# Patient Record
Sex: MALE | Race: WHITE | NOT HISPANIC OR LATINO | Employment: FULL TIME | URBAN - METROPOLITAN AREA
[De-identification: names, ages, dates, MRNs, and addresses within clinical notes are randomized per-mention and may not be internally consistent; named-entity substitution may affect disease eponyms.]

---

## 2018-04-10 ENCOUNTER — HISTORICAL (OUTPATIENT)
Dept: ADMINISTRATIVE | Facility: HOSPITAL | Age: 38
End: 2018-04-10

## 2018-04-10 LAB
ABS NEUT (OLG): 5.9
ALBUMIN SERPL-MCNC: 4.9 GM/DL (ref 3.4–5)
ALBUMIN/GLOB SERPL: 3.27 {RATIO} (ref 1.5–2.5)
ALP SERPL-CCNC: 69 UNIT/L (ref 38–126)
ALT SERPL-CCNC: 22 UNIT/L (ref 7–52)
APPEARANCE, UA: CLEAR
AST SERPL-CCNC: 23 UNIT/L (ref 15–37)
BACTERIA #/AREA URNS AUTO: NORMAL /HPF
BILIRUB SERPL-MCNC: 0.8 MG/DL (ref 0.2–1)
BILIRUB UR QL STRIP: NEGATIVE MG/DL
BILIRUBIN DIRECT+TOT PNL SERPL-MCNC: 0.2 MG/DL (ref 0–0.5)
BUN SERPL-MCNC: 12 MG/DL (ref 7–18)
CALCIUM SERPL-MCNC: 9.6 MG/DL (ref 8.5–10)
CHLORIDE SERPL-SCNC: 103 MMOL/L (ref 98–107)
CHOLEST SERPL-MCNC: 184 MG/DL (ref 0–200)
CHOLEST/HDLC SERPL: 5.3 {RATIO}
CO2 SERPL-SCNC: 29 MMOL/L (ref 21–32)
COLOR UR: YELLOW
CREAT SERPL-MCNC: 1.14 MG/DL (ref 0.6–1.3)
CREAT/UREA NIT SERPL: 10.5
ERYTHROCYTE [DISTWIDTH] IN BLOOD BY AUTOMATED COUNT: 13.1 % (ref 11.5–17)
GGT SERPL-CCNC: 36 UNIT/L (ref 5–85)
GLOBULIN SER-MCNC: 1.5 GM/DL (ref 1.2–3)
GLUCOSE (UA): NEGATIVE MG/DL
GLUCOSE SERPL-MCNC: 100 MG/DL (ref 74–106)
HCT VFR BLD AUTO: 48.7 % (ref 42–52)
HDLC SERPL-MCNC: 35 MG/DL (ref 35–60)
HGB BLD-MCNC: 17 GM/DL (ref 14–18)
HGB UR QL STRIP: NEGATIVE UNIT/L
KETONES UR QL STRIP: NEGATIVE MG/DL
LDH SERPL-CCNC: 157 UNIT/L (ref 140–271)
LDLC SERPL CALC-MCNC: 125 MG/DL (ref 0–129)
LEUKOCYTE ESTERASE UR QL STRIP: NEGATIVE UNIT/L
LYMPHOCYTES # BLD AUTO: 1.7 X10(3)/MCL (ref 0.6–3.4)
LYMPHOCYTES NFR BLD AUTO: 20.5 % (ref 13–40)
MCH RBC QN AUTO: 32.7 PG (ref 27–31.2)
MCHC RBC AUTO-ENTMCNC: 35 GM/DL (ref 32–36)
MCV RBC AUTO: 94 FL (ref 80–94)
MONOCYTES # BLD AUTO: 0.8 X10(3)/MCL (ref 0–1.8)
MONOCYTES NFR BLD AUTO: 9.8 % (ref 0.1–24)
NEUTROPHILS NFR BLD AUTO: 69.7 % (ref 47–80)
NITRITE UR QL STRIP.AUTO: NEGATIVE
PH UR STRIP: 7.5 [PH]
PLATELET # BLD AUTO: 253 X10(3)/MCL (ref 130–400)
PMV BLD AUTO: 9 FL
POTASSIUM SERPL-SCNC: 4 MMOL/L (ref 3.5–5.1)
PROT SERPL-MCNC: 6.4 GM/DL (ref 6.4–8.2)
PROT UR QL STRIP: NEGATIVE MG/DL
RBC # BLD AUTO: 5.2 X10(6)/MCL (ref 4.7–6.1)
RBC #/AREA URNS HPF: NORMAL /HPF
SODIUM SERPL-SCNC: 138 MMOL/L (ref 136–145)
SP GR UR STRIP: 1.02
SQUAMOUS EPITHELIAL, UA: NORMAL /LPF
TRIGL SERPL-MCNC: 135 MG/DL (ref 30–150)
UROBILINOGEN UR STRIP-ACNC: 0.2 MG/DL
VLDLC SERPL CALC-MCNC: 27 MG/DL
WBC # SPEC AUTO: 8.4 X10(3)/MCL (ref 4.5–11.5)
WBC #/AREA URNS AUTO: NORMAL /[HPF]

## 2019-08-27 ENCOUNTER — HISTORICAL (OUTPATIENT)
Dept: ADMINISTRATIVE | Facility: HOSPITAL | Age: 39
End: 2019-08-27

## 2019-08-27 LAB
ABS NEUT (OLG): 4.8 X10(3)/MCL (ref 2.1–9.2)
ALBUMIN SERPL-MCNC: 5 GM/DL (ref 3.4–5)
ALBUMIN/GLOB SERPL: 2.08 {RATIO} (ref 1.5–2.5)
ALP SERPL-CCNC: 71 UNIT/L (ref 38–126)
ALT SERPL-CCNC: 13 UNIT/L (ref 7–52)
APPEARANCE, UA: CLEAR
AST SERPL-CCNC: 25 UNIT/L (ref 15–37)
BACTERIA #/AREA URNS AUTO: NORMAL /HPF
BILIRUB SERPL-MCNC: 1.1 MG/DL (ref 0.2–1)
BILIRUB UR QL STRIP: NEGATIVE MG/DL
BILIRUBIN DIRECT+TOT PNL SERPL-MCNC: 0.2 MG/DL (ref 0–0.5)
BILIRUBIN DIRECT+TOT PNL SERPL-MCNC: 0.9 MG/DL
BUN SERPL-MCNC: 11 MG/DL (ref 7–18)
CALCIUM SERPL-MCNC: 9.8 MG/DL (ref 8.5–10)
CHLORIDE SERPL-SCNC: 101 MMOL/L (ref 98–107)
CHOLEST SERPL-MCNC: 208 MG/DL (ref 0–200)
CHOLEST/HDLC SERPL: 5.3 {RATIO}
CO2 SERPL-SCNC: 31 MMOL/L (ref 21–32)
COLOR UR: YELLOW
CREAT SERPL-MCNC: 1.15 MG/DL (ref 0.6–1.3)
ERYTHROCYTE [DISTWIDTH] IN BLOOD BY AUTOMATED COUNT: 13.2 % (ref 11.5–17)
GLOBULIN SER-MCNC: 2.4 GM/DL (ref 1.2–3)
GLUCOSE (UA): NEGATIVE MG/DL
GLUCOSE SERPL-MCNC: 85 MG/DL (ref 74–106)
HCT VFR BLD AUTO: 43.4 % (ref 42–52)
HDLC SERPL-MCNC: 39 MG/DL (ref 35–60)
HGB BLD-MCNC: 15.6 GM/DL (ref 14–18)
HGB UR QL STRIP: NEGATIVE UNIT/L
KETONES UR QL STRIP: NORMAL MG/DL
LDLC SERPL CALC-MCNC: 129 MG/DL (ref 0–129)
LEUKOCYTE ESTERASE UR QL STRIP: NEGATIVE UNIT/L
LYMPHOCYTES # BLD AUTO: 2.2 X10(3)/MCL (ref 0.6–3.4)
LYMPHOCYTES NFR BLD AUTO: 29.5 % (ref 13–40)
MCH RBC QN AUTO: 32.1 PG (ref 27–31.2)
MCHC RBC AUTO-ENTMCNC: 36 GM/DL (ref 32–36)
MCV RBC AUTO: 89 FL (ref 80–94)
MONOCYTES # BLD AUTO: 0.6 X10(3)/MCL (ref 0.1–1.3)
MONOCYTES NFR BLD AUTO: 8.2 % (ref 0.1–24)
NEUTROPHILS NFR BLD AUTO: 62.3 % (ref 47–80)
NITRITE UR QL STRIP.AUTO: NEGATIVE
PH UR STRIP: 7 [PH]
PLATELET # BLD AUTO: 230 X10(3)/MCL (ref 130–400)
PMV BLD AUTO: 9.3 FL (ref 9.4–12.4)
POTASSIUM SERPL-SCNC: 4.1 MMOL/L (ref 3.5–5.1)
PROT SERPL-MCNC: 7.4 GM/DL (ref 6.4–8.2)
PROT UR QL STRIP: NEGATIVE MG/DL
PSA SERPL-MCNC: 0.83 NG/ML (ref 0–2.5)
RBC # BLD AUTO: 4.86 X10(6)/MCL (ref 4.7–6.1)
RBC #/AREA URNS HPF: NORMAL /HPF
SODIUM SERPL-SCNC: 138 MMOL/L (ref 136–145)
SP GR UR STRIP: 1.01
SQUAMOUS EPITHELIAL, UA: NORMAL /LPF
TRIGL SERPL-MCNC: 130 MG/DL (ref 30–150)
UROBILINOGEN UR STRIP-ACNC: 0.2 MG/DL
VLDLC SERPL CALC-MCNC: 26 MG/DL
WBC # SPEC AUTO: 7.6 X10(3)/MCL (ref 4.5–11.5)
WBC #/AREA URNS AUTO: NORMAL /[HPF]

## 2020-01-30 ENCOUNTER — HISTORICAL (OUTPATIENT)
Dept: LAB | Facility: HOSPITAL | Age: 40
End: 2020-01-30

## 2020-01-30 ENCOUNTER — HISTORICAL (OUTPATIENT)
Dept: ADMINISTRATIVE | Facility: HOSPITAL | Age: 40
End: 2020-01-30

## 2020-01-30 LAB
ERYTHROCYTE [SEDIMENTATION RATE] IN BLOOD: 1 MM/HR (ref 0–15)
RHEUMATOID FACT SERPL-ACNC: <10 IU/ML (ref 0–15)
URATE SERPL-MCNC: 8.9 MG/DL (ref 2.6–7.2)

## 2020-07-15 ENCOUNTER — HISTORICAL (OUTPATIENT)
Dept: ADMINISTRATIVE | Facility: HOSPITAL | Age: 40
End: 2020-07-15

## 2020-08-31 ENCOUNTER — HISTORICAL (OUTPATIENT)
Dept: ADMINISTRATIVE | Facility: HOSPITAL | Age: 40
End: 2020-08-31

## 2020-08-31 LAB
ABS NEUT (OLG): 4.8 X10(3)/MCL (ref 2.1–9.2)
ALBUMIN SERPL-MCNC: 5 GM/DL (ref 3.4–5)
ALBUMIN/GLOB SERPL: 2.63 {RATIO} (ref 1.5–2.5)
ALP SERPL-CCNC: 57 UNIT/L (ref 38–126)
ALT SERPL-CCNC: 15 UNIT/L (ref 7–52)
APPEARANCE, UA: CLEAR
AST SERPL-CCNC: 19 UNIT/L (ref 15–37)
BACTERIA #/AREA URNS AUTO: ABNORMAL /HPF
BILIRUB SERPL-MCNC: 0.9 MG/DL (ref 0.2–1)
BILIRUB UR QL STRIP: ABNORMAL MG/DL
BILIRUBIN DIRECT+TOT PNL SERPL-MCNC: 0.2 MG/DL (ref 0–0.5)
BILIRUBIN DIRECT+TOT PNL SERPL-MCNC: 0.7 MG/DL
BUN SERPL-MCNC: 19 MG/DL (ref 7–18)
CALCIUM SERPL-MCNC: 10.3 MG/DL (ref 8.5–10.1)
CHLORIDE SERPL-SCNC: 102 MMOL/L (ref 98–107)
CHOLEST SERPL-MCNC: 207 MG/DL (ref 0–200)
CHOLEST/HDLC SERPL: 4.7 {RATIO}
CO2 SERPL-SCNC: 28 MMOL/L (ref 21–32)
COLOR UR: YELLOW
CREAT SERPL-MCNC: 1.17 MG/DL (ref 0.6–1.3)
ERYTHROCYTE [DISTWIDTH] IN BLOOD BY AUTOMATED COUNT: 13.4 % (ref 11.5–17)
GLOBULIN SER-MCNC: 2 GM/DL (ref 1.2–3)
GLUCOSE (UA): NEGATIVE MG/DL
GLUCOSE SERPL-MCNC: 95 MG/DL (ref 74–106)
HCT VFR BLD AUTO: 43 % (ref 42–52)
HDLC SERPL-MCNC: 44 MG/DL (ref 35–60)
HGB BLD-MCNC: 15 GM/DL (ref 14–18)
HGB UR QL STRIP: NEGATIVE UNIT/L
KETONES UR QL STRIP: ABNORMAL MG/DL
LDLC SERPL CALC-MCNC: 145 MG/DL (ref 0–129)
LEUKOCYTE ESTERASE UR QL STRIP: NEGATIVE UNIT/L
LYMPHOCYTES # BLD AUTO: 1.9 X10(3)/MCL (ref 0.6–3.4)
LYMPHOCYTES NFR BLD AUTO: 25.7 % (ref 13–40)
MCH RBC QN AUTO: 32.1 PG (ref 27–31.2)
MCHC RBC AUTO-ENTMCNC: 35 GM/DL (ref 32–36)
MCV RBC AUTO: 92 FL (ref 80–94)
MONOCYTES # BLD AUTO: 0.6 X10(3)/MCL (ref 0.1–1.3)
MONOCYTES NFR BLD AUTO: 8.9 % (ref 0.1–24)
NEUTROPHILS NFR BLD AUTO: 65.4 % (ref 47–80)
NITRITE UR QL STRIP.AUTO: NEGATIVE
PH UR STRIP: 7 [PH]
PLATELET # BLD AUTO: 264 X10(3)/MCL (ref 130–400)
PMV BLD AUTO: 9.3 FL (ref 9.4–12.4)
POTASSIUM SERPL-SCNC: 4.1 MMOL/L (ref 3.5–5.1)
PROT SERPL-MCNC: 6.9 GM/DL (ref 6.4–8.2)
PROT UR QL STRIP: NEGATIVE MG/DL
PSA SERPL-MCNC: 1.07 NG/ML (ref 0–2.5)
RBC # BLD AUTO: 4.68 X10(6)/MCL (ref 4.7–6.1)
RBC #/AREA URNS HPF: ABNORMAL /HPF
SODIUM SERPL-SCNC: 139 MMOL/L (ref 136–145)
SP GR UR STRIP: 1.02
SQUAMOUS EPITHELIAL, UA: ABNORMAL /LPF
TESTOST SERPL-MCNC: 604 NG/DL (ref 300–1060)
TRIGL SERPL-MCNC: 138 MG/DL (ref 30–150)
UROBILINOGEN UR STRIP-ACNC: 0.2 MG/DL
VLDLC SERPL CALC-MCNC: 27.6 MG/DL
WBC # SPEC AUTO: 7.3 X10(3)/MCL (ref 4.5–11.5)
WBC #/AREA URNS AUTO: ABNORMAL /[HPF]

## 2021-06-11 ENCOUNTER — HISTORICAL (OUTPATIENT)
Dept: RADIOLOGY | Facility: HOSPITAL | Age: 41
End: 2021-06-11

## 2021-09-03 ENCOUNTER — HISTORICAL (OUTPATIENT)
Dept: ADMINISTRATIVE | Facility: HOSPITAL | Age: 41
End: 2021-09-03

## 2021-09-03 LAB
ABS NEUT (OLG): 6.6 X10(3)/MCL (ref 2.1–9.2)
ALBUMIN SERPL-MCNC: 4.8 GM/DL (ref 3.4–5)
ALBUMIN/GLOB SERPL: 2.4 {RATIO} (ref 1.5–2.5)
ALP SERPL-CCNC: 60 UNIT/L (ref 38–126)
ALT SERPL-CCNC: 22 UNIT/L (ref 7–52)
APPEARANCE, UA: CLEAR
AST SERPL-CCNC: 26 UNIT/L (ref 15–37)
BACTERIA #/AREA URNS AUTO: ABNORMAL /HPF
BILIRUB SERPL-MCNC: 1.1 MG/DL (ref 0.2–1)
BILIRUB UR QL STRIP: NEGATIVE MG/DL
BILIRUBIN DIRECT+TOT PNL SERPL-MCNC: 0.2 MG/DL (ref 0–0.5)
BILIRUBIN DIRECT+TOT PNL SERPL-MCNC: 0.9 MG/DL
BUN SERPL-MCNC: 18 MG/DL (ref 7–18)
CALCIUM SERPL-MCNC: 9.9 MG/DL (ref 8.5–10.1)
CHLORIDE SERPL-SCNC: 102 MMOL/L (ref 98–107)
CHOLEST SERPL-MCNC: 201 MG/DL (ref 0–200)
CHOLEST/HDLC SERPL: 4.9 {RATIO}
CO2 SERPL-SCNC: 30 MMOL/L (ref 21–32)
COLOR UR: YELLOW
CREAT SERPL-MCNC: 1.86 MG/DL (ref 0.6–1.3)
ERYTHROCYTE [DISTWIDTH] IN BLOOD BY AUTOMATED COUNT: 12.7 % (ref 11.5–17)
GLOBULIN SER-MCNC: 2 GM/DL (ref 1.2–3)
GLUCOSE (UA): NEGATIVE MG/DL
GLUCOSE SERPL-MCNC: 94 MG/DL (ref 74–106)
HCT VFR BLD AUTO: 43.6 % (ref 42–52)
HDLC SERPL-MCNC: 41 MG/DL (ref 35–60)
HGB BLD-MCNC: 15.2 GM/DL (ref 14–18)
HGB UR QL STRIP: NEGATIVE UNIT/L
KETONES UR QL STRIP: ABNORMAL MG/DL
LDLC SERPL CALC-MCNC: 130 MG/DL (ref 0–129)
LEUKOCYTE ESTERASE UR QL STRIP: NEGATIVE UNIT/L
LYMPHOCYTES # BLD AUTO: 1.7 X10(3)/MCL (ref 0.6–3.4)
LYMPHOCYTES NFR BLD AUTO: 18.6 % (ref 13–40)
MCH RBC QN AUTO: 32.5 PG (ref 27–31.2)
MCHC RBC AUTO-ENTMCNC: 35 GM/DL (ref 32–36)
MCV RBC AUTO: 93 FL (ref 80–94)
MONOCYTES # BLD AUTO: 0.8 X10(3)/MCL (ref 0.1–1.3)
MONOCYTES NFR BLD AUTO: 9.1 % (ref 0.1–24)
NEUTROPHILS NFR BLD AUTO: 72.3 % (ref 47–80)
NITRITE UR QL STRIP.AUTO: NEGATIVE
PH UR STRIP: 6.5 [PH]
PLATELET # BLD AUTO: 243 X10(3)/MCL (ref 130–400)
PMV BLD AUTO: 9.6 FL (ref 9.4–12.4)
POTASSIUM SERPL-SCNC: 4.1 MMOL/L (ref 3.5–5.1)
PROT SERPL-MCNC: 6.8 GM/DL (ref 6.4–8.2)
PROT UR QL STRIP: NEGATIVE MG/DL
PSA SERPL-MCNC: 0.99 NG/ML (ref 0–2.5)
RBC # BLD AUTO: 4.67 X10(6)/MCL (ref 4.7–6.1)
RBC #/AREA URNS HPF: ABNORMAL /HPF
SODIUM SERPL-SCNC: 137 MMOL/L (ref 136–145)
SP GR UR STRIP: 1.02
SQUAMOUS EPITHELIAL, UA: ABNORMAL /LPF
TESTOST SERPL-MCNC: 538 NG/DL (ref 300–1060)
TRIGL SERPL-MCNC: 120 MG/DL (ref 30–150)
UROBILINOGEN UR STRIP-ACNC: 0.2 MG/DL
VLDLC SERPL CALC-MCNC: 24 MG/DL
WBC # SPEC AUTO: 9.1 X10(3)/MCL (ref 4.5–11.5)
WBC #/AREA URNS AUTO: ABNORMAL /[HPF]

## 2022-04-10 ENCOUNTER — HISTORICAL (OUTPATIENT)
Dept: ADMINISTRATIVE | Facility: HOSPITAL | Age: 42
End: 2022-04-10

## 2022-04-27 VITALS
WEIGHT: 233.69 LBS | HEIGHT: 75 IN | DIASTOLIC BLOOD PRESSURE: 70 MMHG | SYSTOLIC BLOOD PRESSURE: 118 MMHG | BODY MASS INDEX: 29.06 KG/M2

## 2022-05-04 NOTE — HISTORICAL [facility]ER
This is a historical note converted from Mario. Formatting and pictures may have been removed.  Please reference Mario for original formatting and attached multimedia. Chief Complaint  PT C/O RIGHT ANKLE PAIN. ?PT ROLLED ANKLE SUNDAY EVENING WHILE PLAYING BASKETBALL.  History of Present Illness  Patient is here today with complaints of right?ankle?pain?since rolling?his ankle?on Sunday?while playing basketball at his home.? He is able to walk on it?however he does have swelling and bruising?to the lateral?posterior region of his?foot.? He reports that he has had a tendency to?sprain his ankle?in the past but has not?done it lately.  Review of Systems  Right? Ankle:?+swelling,noredness,?+?history of traumainversion injury,?+history of similar injury/trauma?in the past but years ago,?  ??+?ability to walk > 4 steps,??+?point tenderness to palpation near distal fibula,??no?history of gout or inflammatory joint disease  Physical Exam  Vitals & Measurements  T:?36.8? ?C (Oral)? HR:?74(Peripheral)? BP:?116/74?  HT:?190.5?cm? WT:?108.3?kg? BMI:?30?  Right?Ankle:???+?swelling?_,?+2?Dorsal pedis pulse,?+2? posterior tibial pulse,?no? loss of sensation,?+?pain withinversion?of ankle,  ??????????????????????????????+?ecchymosisalong lateral ankle/foot,?+?ability to walk > 4 steps in office,?+? point tenderness?over distal fibula  Assessment/Plan  1.?Right ankle injury?S99.911A  X-ray--negative for fracture, small spur on?dorsal midfoot---recommend?rest, ICE, Compression brace , elevation--Mobic 15 mg?as needed?(patient will call if needs?another refill sent to pharmacy)  Ordered:  Ankle Brace  PC, 07/15/20 13:31:00 CDT, [facility], 07/15/20 13:31:00 CDT, Right ankle injury  Office/Outpatient Visit Level 3 Established 90268 PC, Right ankle injury, [facility], 07/15/20 13:31:00 CDT  XR Ankle Right Minimum 3 Views, Routine, 07/15/20 13:16:00 CDT, Other (please specify), None, Ambulatory, Rad Type, Right ankle  injury, [facility], 07/15/20 13:16:00 CDT  ?  Orders:  Clinic Follow-up PRN, 07/15/20 13:31:00 CDT, [facility], Future Order  Referrals  Clinic Follow-up PRN, 07/15/20 13:31:00 CDT, [facility], Future Order   Problem List/Past Medical History  Ongoing  Epididymitis  Obesity  Pharyngitis  Wellness examination  Historical  ADD - Attention deficit disorder  Epididymitis  Migraine  Procedure/Surgical History  Extraction of wisdom tooth  Tonsillectomy   Medications  Fish Oil oral capsule, 1 cap(s), Oral, Daily  meloxicam 15 mg oral tablet, See Instructions  Misc Prescription  Allergies  No Known Medication Allergies  Social History  Abuse/Neglect  No, 07/15/2020  No, 01/30/2020  No, 08/27/2019  No, 07/22/2019  Alcohol  Current, 3-5 times per week, 04/10/2018  Employment/School  Previous employment/school:  [employer] 03/28/2018  Home/Environment  Lives with Children, Spouse., 04/10/2018  Tobacco  Former smoker, quit more than 30 days ago, N/A, 07/15/2020  Former smoker, quit more than 30 days ago, N/A, 01/30/2020  Former smoker, quit more than 30 days ago, N/A, 08/27/2019  Former smoker, quit more than 30 days ago, N/A, 07/22/2019  Former smoker, 03/28/2018  Family History  Acute myocardial infarction.: Father.  Coronary artery bypass grafts x 3: Father.  Gallbladder: Father.  Healthy adult: Mother.  Pacemaker rhythm: Father.  Parkinson disease: Father.  Immunizations  Vaccine Date Status Comments   tetanus/diphth/pertuss (Tdap) adult/adol 2015 Recorded per pt   Health Maintenance  Health Maintenance  ???Pending?(in the next year)  ??? ??OverDue  ??? ? ? ?Alcohol Misuse Screening due??01/02/20??and every 1??year(s)  ??? ??Due?  ??? ? ? ?ADL Screening due??07/15/20??and every 1??year(s)  ??? ? ? ?Depression Screening due??07/15/20??and every?  ??? ? ? ?Influenza Vaccine due??07/15/20??and every?  ??? ??Due In Future?  ??? ? ? ?Obesity Screening not due  until??01/01/21??and every 1??year(s)  ???Satisfied?(in the past 1 year)  ??? ??Satisfied?  ??? ? ? ?Blood Pressure Screening on??07/15/20.??Satisfied by Arcelia Knight CMA  ??? ? ? ?Body Mass Index Check on??07/15/20.??Satisfied by Arcelia Knight CMA  ??? ? ? ?Influenza Vaccine on??01/30/20.??Satisfied by Arcelia Knight CMA  ??? ? ? ?Lipid Screening on??08/27/19.??Satisfied by Edelmira Knight  ??? ? ? ?Obesity Screening on??07/15/20.??Satisfied by Arcelia Knight CMA  ?

## 2022-05-04 NOTE — HISTORICAL [facility]
This is a historical note converted from Mario. Formatting and pictures may have been removed.  Please reference Mario for original formatting and attached multimedia. Chief Complaint  PT C/O RIGHT HAND PAIN/SWELLING THAT STARTED 2 WEEKS AGO. ?PT WOKE UP WITH COUGH THIS MORNING. ?HIS ENTIRE FAMILY HAS A COLD.  History of Present Illness  Patient is here today with complaints of?right hand pain for the last 2 weeks.? He is not aware of any trauma however does do CrossFit?which requires a lot of?gripping?items.? He is tried altering?his exercise?but has not seen any improvement in his discomfort.? It is even aggravated by typing on the computer.? No history of rheumatological diseases.  Review of Systems  R hand pain--x 2 weeks, unaware of trauma, no change in activity, does cross fit bu no new exercises he hasnt done over last year.  Physical Exam  Vitals & Measurements  HR:?58(Peripheral)? BP:?104/58?  HT:?190.5?cm? WT:?107.3?kg? BMI:?29.57?  Right hand --mild swelling and tenderness around 3rd, 4th, and fifth MCP joint, FROM fingers but discomfort with full extension  Assessment/Plan  1.?Right hand pain?M79.641  RF, ESR, uric acid, BIANCA---Xray right hand-neg--- ?Trial of Mobic 15 mg #30--rest  Ordered:  Antinuclear Antibodies by [facility] [id number], Routine collect, 01/30/20 11:03:00 CST, Blood, Order for future visit, Stop date 01/30/20 11:03:00 CST, Lab Collect, Right hand pain  Arthritis, 01/30/20 11:03:00 CST  Lab Collection Request, 01/30/20 11:03:00 CST, [facility], 01/30/20 11:03:00 CST, Right hand pain  Arthritis  Office/Outpatient Visit Level 3 Established 08030 PC, Right hand pain  Arthritis, [facility], 01/30/20 11:04:00 CST  Rheumatoid Factor Quantitative, Routine collect, 01/30/20 11:03:00 CST, Blood, Order for future visit, Stop date 01/30/20 11:03:00 CST, Lab Collect, Right hand pain  Arthritis, 01/30/20 11:03:00 CST  Sedimentation Rate, Routine collect, 01/30/20 11:03:00 CST, Blood,  Order for future visit, Stop date 01/30/20 11:03:00 CST, Lab Collect, Right hand pain  Arthritis, 01/30/20 11:03:00 CST  Uric Acid, Routine collect, 01/30/20 11:03:00 CST, Blood, Order for future visit, Stop date 01/30/20 11:03:00 CST, Lab Collect, Right hand pain  Arthritis, 01/30/20 11:03:00 CST  XR Hand Right Minimum 3 Views, Routine, 01/30/20 11:04:00 CST, Pain, None, Ambulatory, Rad Type, Right hand pain  Arthritis, [facility], 01/30/20 11:04:00 CST  ?  2.?Arthritis?M19.90  Ordered:  Antinuclear Antibodies by [facility] [id number], Routine collect, 01/30/20 11:03:00 CST, Blood, Order for future visit, Stop date 01/30/20 11:03:00 CST, Lab Collect, Right hand pain  Arthritis, 01/30/20 11:03:00 CST  Lab Collection Request, 01/30/20 11:03:00 CST, [facility], 01/30/20 11:03:00 CST, Right hand pain  Arthritis  Office/Outpatient Visit Level 3 Established 16607 PC, Right hand pain  Arthritis, [facility], 01/30/20 11:04:00 CST  Rheumatoid Factor Quantitative, Routine collect, 01/30/20 11:03:00 CST, Blood, Order for future visit, Stop date 01/30/20 11:03:00 CST, Lab Collect, Right hand pain  Arthritis, 01/30/20 11:03:00 CST  Sedimentation Rate, Routine collect, 01/30/20 11:03:00 CST, Blood, Order for future visit, Stop date 01/30/20 11:03:00 CST, Lab Collect, Right hand pain  Arthritis, 01/30/20 11:03:00 CST  Uric Acid, Routine collect, 01/30/20 11:03:00 CST, Blood, Order for future visit, Stop date 01/30/20 11:03:00 CST, Lab Collect, Right hand pain  Arthritis, 01/30/20 11:03:00 CST  XR Hand Right Minimum 3 Views, Routine, 01/30/20 11:04:00 CST, Pain, None, Ambulatory, Rad Type, Right hand pain  Arthritis, [facility], 01/30/20 11:04:00 CST  ?  Orders:  Clinic Follow-up PRN, 01/30/20 11:04:00 CST, [facility], Future Order  wife with URI x 1 week --request Z pack in case he develops  Referrals  Clinic Follow-up PRN, 01/30/20 11:04:00 CST, [facility], Future  Order   Problem List/Past Medical History  Ongoing  Epididymitis  Obesity  Pharyngitis  Wellness examination  Historical  ADD - Attention deficit disorder  Epididymitis  Migraine  Procedure/Surgical History  Extraction of wisdom tooth  Tonsillectomy   Medications  Fish Oil oral capsule, 1 cap(s), Oral, Daily  Misc Prescription  Mobic 15 mg oral tablet, 15 mg= 1 tab(s), Oral, Daily  Allergies  No Known Medication Allergies  Social History  Abuse/Neglect  No, 01/30/2020  No, 08/27/2019  No, 07/22/2019  Alcohol  Current, 3-5 times per week, 04/10/2018  Employment/School  Previous employment/school:  [employer] 03/28/2018  Home/Environment  Lives with Children, Spouse., 04/10/2018  Tobacco  Former smoker, quit more than 30 days ago, N/A, 01/30/2020  Former smoker, quit more than 30 days ago, N/A, 08/27/2019  Former smoker, quit more than 30 days ago, N/A, 07/22/2019  Former smoker, 03/28/2018  Family History  Acute myocardial infarction.: Father.  Coronary artery bypass grafts x 3: Father.  Gallbladder: Father.  Healthy adult: Mother.  Pacemaker rhythm: Father.  Parkinson disease: Father.  Immunizations  Vaccine Date Status Comments   tetanus/diphth/pertuss (Tdap) adult/adol 2015 Recorded per pt   Health Maintenance  Health Maintenance  ???Pending?(in the next year)  ??? ??Due?  ??? ? ? ?Alcohol Misuse Screening due??01/01/20??and every 1??year(s)  ??? ? ? ?ADL Screening due??01/30/20??and every 1??year(s)  ??? ? ? ?Depression Screening due??01/30/20??and every?  ??? ??Due In Future?  ??? ? ? ?Obesity Screening not due until??01/01/21??and every 1??year(s)  ??? ? ? ?Blood Pressure Screening not due until??01/29/21??and every 1??year(s)  ??? ? ? ?Body Mass Index Check not due until??01/29/21??and every 1??year(s)  ???Satisfied?(in the past 1 year)  ??? ??Satisfied?  ??? ? ? ?Blood Pressure Screening on??01/30/20.??Satisfied by Arcelia Knight CMA  ??? ? ? ?Body Mass Index Check  on??01/30/20.??Satisfied by Arcelia Knight CMA  ??? ? ? ?Influenza Vaccine on??01/30/20.??Satisfied by Arcelia Knight CMA  ??? ? ? ?Lipid Screening on??08/27/19.??Satisfied by Edelmira Knight  ??? ? ? ?Obesity Screening on??01/30/20.??Satisfied by Arcelia Knight CMA  ?

## 2022-09-06 PROBLEM — Z23 IMMUNIZATION DUE: Status: ACTIVE | Noted: 2022-09-06

## 2022-09-06 PROBLEM — Z00.00 ENCOUNTER FOR WELLNESS EXAMINATION IN ADULT: Status: ACTIVE | Noted: 2022-09-06

## 2022-09-06 PROBLEM — Z12.5 PROSTATE CANCER SCREENING: Status: ACTIVE | Noted: 2022-09-06

## 2022-12-12 PROBLEM — Z00.00 ENCOUNTER FOR WELLNESS EXAMINATION IN ADULT: Status: RESOLVED | Noted: 2022-09-06 | Resolved: 2022-12-12

## 2023-09-11 PROBLEM — Z82.49 FAMILY HISTORY OF ISCHEMIC HEART DISEASE: Status: ACTIVE | Noted: 2023-09-11

## 2023-09-12 PROCEDURE — 87389 HIV-1 AG W/HIV-1&-2 AB AG IA: CPT | Performed by: FAMILY MEDICINE

## 2023-09-12 PROCEDURE — 86803 HEPATITIS C AB TEST: CPT | Performed by: FAMILY MEDICINE

## 2023-12-11 PROBLEM — Z00.00 ENCOUNTER FOR WELLNESS EXAMINATION IN ADULT: Status: RESOLVED | Noted: 2022-09-06 | Resolved: 2023-12-11

## 2024-10-09 PROBLEM — R10.10 UPPER ABDOMINAL PAIN: Status: ACTIVE | Noted: 2024-10-09

## 2024-10-09 PROCEDURE — 83690 ASSAY OF LIPASE: CPT | Performed by: FAMILY MEDICINE
